# Patient Record
Sex: FEMALE | Race: WHITE | Employment: FULL TIME | URBAN - METROPOLITAN AREA
[De-identification: names, ages, dates, MRNs, and addresses within clinical notes are randomized per-mention and may not be internally consistent; named-entity substitution may affect disease eponyms.]

---
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
## 2021-05-11 ENCOUNTER — TRANSCRIBE ORDER (OUTPATIENT)
Dept: SCHEDULING | Age: 21
End: 2021-05-11

## 2021-05-11 DIAGNOSIS — D44.0 TERATOMA OF UNCERTAIN BEHAVIOR OF THYROID GLAND: Primary | ICD-10-CM

## 2021-05-17 ENCOUNTER — HOSPITAL ENCOUNTER (OUTPATIENT)
Dept: ULTRASOUND IMAGING | Age: 21
Discharge: HOME | End: 2021-05-17
Attending: FAMILY MEDICINE
Payer: COMMERCIAL

## 2021-05-17 DIAGNOSIS — D44.0 TERATOMA OF UNCERTAIN BEHAVIOR OF THYROID GLAND: ICD-10-CM

## 2021-05-17 PROCEDURE — 76536 US EXAM OF HEAD AND NECK: CPT

## 2021-05-20 ENCOUNTER — TELEPHONE (OUTPATIENT)
Dept: SURGERY | Age: 21
End: 2021-05-20

## 2021-05-20 ENCOUNTER — OFFICE VISIT (OUTPATIENT)
Dept: SURGERY | Age: 21
End: 2021-05-20
Payer: COMMERCIAL

## 2021-05-20 VITALS
BODY MASS INDEX: 20.35 KG/M2 | DIASTOLIC BLOOD PRESSURE: 86 MMHG | OXYGEN SATURATION: 97 % | HEIGHT: 64 IN | WEIGHT: 119.2 LBS | TEMPERATURE: 97.5 F | SYSTOLIC BLOOD PRESSURE: 123 MMHG | HEART RATE: 120 BPM

## 2021-05-20 DIAGNOSIS — E05.90 HYPERTHYROIDISM: Primary | ICD-10-CM

## 2021-05-20 PROCEDURE — 99244 OFF/OP CNSLTJ NEW/EST MOD 40: CPT | Performed by: SURGERY

## 2021-05-20 RX ORDER — OXYCODONE HYDROCHLORIDE 5 MG/1
5 TABLET ORAL
COMMUNITY

## 2021-05-20 RX ORDER — ALPRAZOLAM 0.25 MG/1
0.25 TABLET ORAL
COMMUNITY
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
## 2021-05-20 NOTE — Clinical Note
5/20/2021 Patient: Slava Diaz YOB: 2000 Date of Visit: 5/20/2021 Eden Amor MD 
[address] [address] [address] Fax: [phone] Dear Eden Amor MD, Thank you for referring Ms. Jacob Rodrigues to [address] for evaluation. My notes for this consultation are attached. If you have questions, please do not hesitate to call me. I look forward to following your patient along with you. Sincerely, Daina Hall MD

## 2021-05-20 NOTE — TELEPHONE ENCOUNTER
Called [facility] and spoke with [employer] [employer], and  Cpt code 14373, left thyroid nodule bx does not need auth. Reference # [id number].

## 2021-06-01 ENCOUNTER — HOSPITAL ENCOUNTER (OUTPATIENT)
Dept: NUCLEAR MEDICINE | Age: 21
Discharge: HOME | End: 2021-06-01
Attending: SURGERY
Payer: COMMERCIAL

## 2021-06-01 DIAGNOSIS — E05.90 HYPERTHYROIDISM: ICD-10-CM

## 2021-06-01 PROCEDURE — 78014 THYROID IMAGING W/BLOOD FLOW: CPT

## 2021-06-02 ENCOUNTER — HOSPITAL ENCOUNTER (OUTPATIENT)
Dept: NUCLEAR MEDICINE | Age: 21
Discharge: HOME | End: 2021-06-02
Attending: SURGERY
Payer: COMMERCIAL

## 2021-06-03 ENCOUNTER — TELEPHONE (OUTPATIENT)
Dept: SURGERY | Age: 21
End: 2021-06-03

## 2021-06-03 NOTE — TELEPHONE ENCOUNTER
US: IMPRESSION  Solitary cystic left thyroid nodule with a thick hypervascular rim containing  multiple microcalcifications. Fine-needle aspiration is recommended. Thyroid scan: IMPRESSION  Toxic left thyroid nodule. Would likely benefit from a left hemithyroidectomy    Eri,  Can you please check on her blood work I am still waiting for it.

## 2021-06-11 LAB
T3 SERPL-MCNC: 223 NG/DL (ref 71–180)
T4 SERPL-MCNC: 9.7 UG/DL (ref 4.5–12)
THYROPEROXIDASE AB SERPL-ACNC: <9 IU/ML (ref 0–34)
TSH SERPL DL<=0.005 MIU/L-ACNC: <0.005 UIU/ML (ref 0.45–4.5)

## 2021-06-14 ENCOUNTER — TELEPHONE (OUTPATIENT)
Dept: SURGERY | Age: 21
End: 2021-06-14

## 2021-06-14 NOTE — TELEPHONE ENCOUNTER
Called pt received answering machine left message for pt to give the office a return call back at her earliest convenience.

## 2021-06-14 NOTE — TELEPHONE ENCOUNTER
Eri,    I looked up the labs myself. They were done last week. Labs c/w a hot nodule. Can you find out when she gets her neck brace off and when she is seeing Dr. Zurdo Branham.      She will eventually need office f/u to discuss possible left hemithyroidectomy

## 2021-06-22 ENCOUNTER — TELEPHONE (OUTPATIENT)
Dept: SURGERY | Age: 21
End: 2021-06-22

## 2021-06-22 NOTE — TELEPHONE ENCOUNTER
Pt's mom calling due to lab work and scans being requested by pt's endocrinologist, Dr. Jess Reaves. [de-identified] office would like the lab work and scans faxed over to [phone]. Please call if any questions.

## 2021-06-22 NOTE — TELEPHONE ENCOUNTER
Office note, radiology reports and lab reports faxed over to Dr Avril Brumfield [phone], confirmation received.

## 2021-07-01 ENCOUNTER — TRANSCRIBE ORDER (OUTPATIENT)
Dept: SCHEDULING | Age: 21
End: 2021-07-01

## 2021-07-01 DIAGNOSIS — E04.1 NONTOXIC UNINODULAR GOITER: ICD-10-CM

## 2021-07-01 DIAGNOSIS — R00.2 PALPITATIONS: ICD-10-CM

## 2021-07-01 DIAGNOSIS — E05.90 HYPERTHYROIDISM: Primary | ICD-10-CM

## 2021-07-01 DIAGNOSIS — E04.1 CYST OF THYROID: ICD-10-CM

## 2021-07-23 ENCOUNTER — TRANSCRIBE ORDER (OUTPATIENT)
Dept: SCHEDULING | Age: 21
End: 2021-07-23

## 2021-07-23 DIAGNOSIS — E04.1 NONTOXIC UNINODULAR GOITER: Primary | ICD-10-CM

## 2021-08-11 ENCOUNTER — HOSPITAL ENCOUNTER (OUTPATIENT)
Dept: ULTRASOUND IMAGING | Age: 21
Discharge: HOME | End: 2021-08-11
Attending: SPECIALIST
Payer: COMMERCIAL

## 2021-08-11 DIAGNOSIS — E04.1 NONTOXIC UNINODULAR GOITER: ICD-10-CM

## 2021-08-11 PROCEDURE — 88172 CYTP DX EVAL FNA 1ST EA SITE: CPT

## 2021-08-11 PROCEDURE — 88173 CYTOPATH EVAL FNA REPORT: CPT

## 2021-08-11 PROCEDURE — 74011000250 HC RX REV CODE- 250: Performed by: STUDENT IN AN ORGANIZED HEALTH CARE EDUCATION/TRAINING PROGRAM

## 2021-08-11 PROCEDURE — 10005 FNA BX W/US GDN 1ST LES: CPT

## 2021-08-11 RX ORDER — LIDOCAINE HYDROCHLORIDE 10 MG/ML
8 INJECTION, SOLUTION EPIDURAL; INFILTRATION; INTRACAUDAL; PERINEURAL
Status: COMPLETED | OUTPATIENT
Start: 2021-08-11 | End: 2021-08-11

## 2021-08-11 RX ADMIN — LIDOCAINE HYDROCHLORIDE 8 ML: 10 INJECTION, SOLUTION EPIDURAL; INFILTRATION; INTRACAUDAL; PERINEURAL at 16:00

## 2021-09-30 ENCOUNTER — TRANSCRIBE ORDER (OUTPATIENT)
Dept: SCHEDULING | Age: 21
End: 2021-09-30

## 2021-09-30 DIAGNOSIS — S06.9XAA TBI (TRAUMATIC BRAIN INJURY): Primary | ICD-10-CM

## 2021-10-07 ENCOUNTER — TRANSCRIBE ORDER (OUTPATIENT)
Dept: SCHEDULING | Age: 21
End: 2021-10-07

## 2021-10-07 DIAGNOSIS — K21.9 GASTROESOPHAGEAL REFLUX DISEASE: ICD-10-CM

## 2021-10-07 DIAGNOSIS — E05.90 PRETIBIAL MYXEDEMA: Primary | ICD-10-CM

## 2021-10-07 DIAGNOSIS — E04.1 NON-TOXIC UNINODULAR GOITER: ICD-10-CM

## 2021-12-07 ENCOUNTER — HOSPITAL ENCOUNTER (OUTPATIENT)
Dept: MRI IMAGING | Age: 21
Discharge: HOME | End: 2021-12-07
Payer: COMMERCIAL

## 2021-12-07 PROCEDURE — 70551 MRI BRAIN STEM W/O DYE: CPT

## 2021-12-29 ENCOUNTER — OFFICE VISIT (OUTPATIENT)
Dept: ORTHOPEDIC SURGERY | Age: 21
End: 2021-12-29
Payer: COMMERCIAL

## 2021-12-29 DIAGNOSIS — M54.2 NECK PAIN: Primary | ICD-10-CM

## 2021-12-29 PROCEDURE — 99213 OFFICE O/P EST LOW 20 MIN: CPT | Performed by: ORTHOPAEDIC SURGERY

## 2021-12-29 RX ORDER — ESCITALOPRAM OXALATE 10 MG/1
TABLET ORAL
COMMUNITY
Start: 2021-12-13

## 2021-12-29 RX ORDER — TRAZODONE HYDROCHLORIDE 100 MG/1
TABLET ORAL
COMMUNITY
Start: 2021-10-27

## 2021-12-29 RX ORDER — LISDEXAMFETAMINE DIMESYLATE 60 MG/1
CAPSULE ORAL
COMMUNITY
Start: 2021-11-29

## 2021-12-29 NOTE — PROGRESS NOTES
Jose Carlos Dukes (: 2000) is a 24 y.o. female patient, here for evaluation of the following chief complaint(s): Other (review MRI results )       ASSESSMENT/PLAN:  Below is the assessment and plan developed based on review of pertinent history, physical exam, labs, studies, and medications. Neck pain MRI shows some subtle early arthritis of the facets of the cervical spine we will start formal PT the PT she has had has not really been hands on working to get rid of all of her bracing we did went over her vitamin D nutrition we talked about doing physical therapy close by 30 minutes face-to-face time  If she fails to respond to PT she is can come back and see us thank you        No follow-ups on file. SUBJECTIVE/OBJECTIVE:  Jose Carlos Dukes (: 2000) is a 24 y.o. female who presents today for the following:  Chief Complaint   Patient presents with    Other     review MRI results        MRI cervical thoracic and lumbar spine no syrinx no evidence of rasta fracture if she had a fracture at C1 is healed she does have some subtle arthritis in the facets in the cervical spine    IMAGING:  None today    No Known Allergies    Current Outpatient Medications   Medication Sig    escitalopram oxalate (LEXAPRO) 10 mg tablet     traZODone (DESYREL) 100 mg tablet     Vyvanse 60 mg capsule     oxyCODONE IR (ROXICODONE) 5 mg immediate release tablet Take 5 mg by mouth every four (4) hours as needed for Pain. (Patient not taking: Reported on 2021)    ALPRAZolam (Xanax) 0.25 mg tablet Take 0.25 mg by mouth. (Patient not taking: Reported on 2021)    cephALEXin (KEFLEX) 500 mg capsule Take 1 Cap by mouth two (2) times a day. (Patient not taking: Reported on 2021)    hydrocortisone (HYCORT) 1 % ointment Apply  to affected area two (2) times a day. use thin layer (Patient not taking: Reported on 2021)     No current facility-administered medications for this visit. History reviewed. No pertinent past medical history. History reviewed. No pertinent surgical history. History reviewed. No pertinent family history. Social History     Tobacco Use    Smoking status: Never Smoker    Smokeless tobacco: Never Used   Substance Use Topics    Alcohol use: No        Review of Systems  ROS     Positive for: Musculoskeletal (back pain )    Negative for: Constitutional, Gastrointestinal, Neurological, Skin, Genitourinary, HENT, Endocrine, Cardiovascular, Eyes, Respiratory, Psychiatric, Allergic/Imm, Heme/Lymph    Last edited by Good Baron RN on 12/29/2021  2:33 PM. (History)         No flowsheet data found. Vitals: There were no vitals taken for this visit. There is no height or weight on file to calculate BMI. Physical Exam    Pleasant young lady well-groomed insert cervical exam there is normal appearance of the cervical spine. There is no cervical lymphadenopathy. There appears to be no scoliosis or kyphosis present. There is no tenderness along the spinous process. The spinous process of the cervical spine and the upper thoracic spine were palpated and there is also no tenderness along the paracervical muscles. Range of motion demonstrates full and painless flexion, extension, rotation and lateral bending. The patient is able to touch chin to chest, extend to 90 degrees, laterally rotate 80 degrees and laterally bend 45 degrees. There is 5/5 muscle strength in all muscle groups tested. Reflexes are +2 and symmetric. There are no findings of spasticity. Scott's sign is negative. Sensory exam is normal to light touch and proprioception. The patient can walk on heels and toes. Negative Romberg. Negative drift. Extraocular motility is intact. No pain with axial compression of the shoulder or head. No pain to palpation, spinous processes, cervical or thoracic or lumbar spine. No pain with flexion or extension of the lumbar spine. Hamstrings are not tight.   No dimples. No hairy patches. No pelvic obliquity. No limb length discrepancy. No clonus. Negative straight leg raise, no prominence on Fitzpatrick forward bending test.  +2 reflexes throughout. 5/5 muscle strength. Painless internal and external rotation of the hips. Abdomen is soft, nontender. No masses are appreciated. No kyphosis present. Sensation is intact to light touch. An electronic signature was used to authenticate this note.   -- Rand Aiken MD

## 2022-10-24 NOTE — LETTER
[address] 
[facility] EMERGENCY DEPT 
[address] [place] [address] [address] [id number] 
[phone] Work/School Note Date: 10/11/2017 To Whom It May concern: 
 
Juanpablo Light was seen and treated today in the emergency room by the following provider(s): 
Attending Provider: Jessica Whitney MD 
Physician Assistant: Latha Grande PA-C. Juanpablo Light may return to school on 16 October 2017 or sooner. Sincerely, Latha Grande PA-C 
 
 
 

no

## 2023-03-10 ENCOUNTER — TRANSCRIBE ORDER (OUTPATIENT)
Dept: SCHEDULING | Age: 23
End: 2023-03-10

## 2023-03-10 DIAGNOSIS — E05.10 TOXIC THYROID NODULE: Primary | ICD-10-CM

## 2023-03-15 ENCOUNTER — TRANSCRIBE ORDER (OUTPATIENT)
Dept: SCHEDULING | Age: 23
End: 2023-03-15

## 2023-03-15 DIAGNOSIS — E05.10 TOXIC THYROID NODULE: Primary | ICD-10-CM

## 2023-03-21 ENCOUNTER — HOSPITAL ENCOUNTER (OUTPATIENT)
Dept: NUCLEAR MEDICINE | Age: 23
Discharge: HOME | End: 2023-03-21
Attending: INTERNAL MEDICINE
Payer: COMMERCIAL

## 2023-03-21 DIAGNOSIS — E05.10 TOXIC THYROID NODULE: ICD-10-CM

## 2023-03-21 PROCEDURE — 78014 THYROID IMAGING W/BLOOD FLOW: CPT

## 2023-03-21 RX ORDER — SODIUM IODIDE I 123 200 UCI/1
200 CAPSULE, GELATIN COATED ORAL ONCE
Status: COMPLETED | OUTPATIENT
Start: 2023-03-21 | End: 2023-03-21

## 2023-03-21 RX ADMIN — SODIUM IODIDE I 123 278 MICRO CURIE: 200 CAPSULE, GELATIN COATED ORAL at 10:30

## 2023-03-22 ENCOUNTER — HOSPITAL ENCOUNTER (OUTPATIENT)
Dept: NUCLEAR MEDICINE | Age: 23
Discharge: HOME | End: 2023-03-22
Attending: INTERNAL MEDICINE
Payer: COMMERCIAL

## 2023-03-23 ENCOUNTER — HOSPITAL ENCOUNTER (OUTPATIENT)
Dept: NUCLEAR MEDICINE | Age: 23
End: 2023-03-23
Attending: INTERNAL MEDICINE
Payer: COMMERCIAL

## 2023-03-24 ENCOUNTER — HOSPITAL ENCOUNTER (OUTPATIENT)
Dept: NUCLEAR MEDICINE | Age: 23
End: 2023-03-24
Attending: INTERNAL MEDICINE
Payer: COMMERCIAL

## 2023-03-24 ENCOUNTER — HOSPITAL ENCOUNTER (OUTPATIENT)
Dept: ULTRASOUND IMAGING | Age: 23
End: 2023-03-24
Attending: INTERNAL MEDICINE
Payer: COMMERCIAL

## 2023-03-24 ENCOUNTER — HOSPITAL ENCOUNTER (OUTPATIENT)
Dept: NUCLEAR MEDICINE | Age: 23
Discharge: HOME | End: 2023-03-24
Attending: INTERNAL MEDICINE
Payer: COMMERCIAL

## 2023-03-24 DIAGNOSIS — E05.10 TOXIC THYROID NODULE: ICD-10-CM

## 2023-03-24 PROCEDURE — A9517 I131 IODIDE CAP, RX: HCPCS

## 2023-03-24 RX ORDER — SODIUM IODIDE I 131 100 MCI/1
31.5 CAPSULE ORAL ONCE
Status: COMPLETED | OUTPATIENT
Start: 2023-03-24 | End: 2023-03-24

## 2023-03-24 RX ADMIN — SODIUM IODIDE I 131 31.5 MILLICURIE: 100 CAPSULE ORAL at 09:05

## 2023-04-23 DIAGNOSIS — E05.10 TOXIC THYROID NODULE: Primary | ICD-10-CM

## 2023-05-25 RX ORDER — ESCITALOPRAM OXALATE 10 MG/1
TABLET ORAL
COMMUNITY
Start: 2021-12-13

## 2023-05-25 RX ORDER — TRAZODONE HYDROCHLORIDE 100 MG/1
TABLET ORAL
COMMUNITY
Start: 2021-10-27

## 2023-05-25 RX ORDER — OXYCODONE HYDROCHLORIDE 5 MG/1
5 TABLET ORAL EVERY 4 HOURS PRN
COMMUNITY

## 2023-05-25 RX ORDER — LISDEXAMFETAMINE DIMESYLATE 60 MG/1
CAPSULE ORAL
COMMUNITY
Start: 2021-11-29

## 2023-05-25 RX ORDER — ALPRAZOLAM 0.25 MG/1
0.25 TABLET ORAL
COMMUNITY

## 2023-05-25 RX ORDER — DIAPER,BRIEF,INFANT-TODD,DISP
EACH MISCELLANEOUS 2 TIMES DAILY
COMMUNITY
Start: 2017-10-12

## 2023-05-25 RX ORDER — CEPHALEXIN 500 MG/1
500 CAPSULE ORAL 2 TIMES DAILY
COMMUNITY
Start: 2017-10-12

## 2024-02-26 ENCOUNTER — HOSPITAL ENCOUNTER (OUTPATIENT)
Facility: HOSPITAL | Age: 24
Discharge: HOME | End: 2024-02-29
Attending: INTERNAL MEDICINE
Payer: COMMERCIAL

## 2024-02-26 DIAGNOSIS — R10.9 RIGHT SIDED ABDOMINAL PAIN: ICD-10-CM

## 2024-02-26 DIAGNOSIS — E05.90 HYPERTHYROIDISM: ICD-10-CM

## 2024-02-26 PROCEDURE — 76705 ECHO EXAM OF ABDOMEN: CPT

## 2024-02-26 PROCEDURE — 76536 US EXAM OF HEAD AND NECK: CPT

## 2024-12-30 ENCOUNTER — HOSPITAL ENCOUNTER (EMERGENCY)
Facility: HOSPITAL | Age: 24
Discharge: LEFT BEFORE DISCHARGE | End: 2024-12-30

## 2024-12-30 VITALS
WEIGHT: 119 LBS | HEIGHT: 64 IN | HEART RATE: 79 BPM | SYSTOLIC BLOOD PRESSURE: 114 MMHG | BODY MASS INDEX: 20.32 KG/M2 | DIASTOLIC BLOOD PRESSURE: 62 MMHG | RESPIRATION RATE: 16 BRPM | OXYGEN SATURATION: 100 %

## 2024-12-30 ASSESSMENT — PAIN DESCRIPTION - DESCRIPTORS: DESCRIPTORS: POUNDING

## 2024-12-30 ASSESSMENT — PAIN DESCRIPTION - LOCATION: LOCATION: HEAD;NECK

## 2024-12-30 ASSESSMENT — PAIN SCALES - GENERAL: PAINLEVEL_OUTOF10: 7

## 2024-12-30 ASSESSMENT — PAIN - FUNCTIONAL ASSESSMENT: PAIN_FUNCTIONAL_ASSESSMENT: 0-10
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
